# Patient Record
Sex: MALE | Race: WHITE
[De-identification: names, ages, dates, MRNs, and addresses within clinical notes are randomized per-mention and may not be internally consistent; named-entity substitution may affect disease eponyms.]

---

## 2019-06-18 ENCOUNTER — HOSPITAL ENCOUNTER (OUTPATIENT)
Dept: HOSPITAL 95 - PLD | Age: 84
Discharge: HOME | End: 2019-06-18
Attending: PHYSICIAN ASSISTANT
Payer: MEDICARE

## 2019-06-18 DIAGNOSIS — C44.319: Primary | ICD-10-CM

## 2019-07-31 ENCOUNTER — HOSPITAL ENCOUNTER (OUTPATIENT)
Dept: HOSPITAL 95 - LAB SHORT | Age: 84
Discharge: HOME | End: 2019-07-31
Attending: DERMATOLOGY
Payer: MEDICARE

## 2019-07-31 DIAGNOSIS — C44.319: Primary | ICD-10-CM

## 2021-02-16 NOTE — NUR
patient verbalized understanding of discharge instructions and precautions.
alert and oriented. groin site stable. transferred via wheel chair to car
where family was waiting.

## 2021-02-16 NOTE — NUR
PT AMB TO BTR WELL, L GROINS SITE STABLE, DRESSING NOW, WIFE CALLED TO COME
PICK PT UP - WILL BE HERE IN 15-20 MIN. IV DC'D INTACT. PT WILL BE DC'D BY
[facility] BECKER WHEN WIFE ARRIVES

## 2021-03-18 NOTE — NUR
PT BROUGHT BACK TO RECOVERY ROOM, LEFT PEDAL ARTERIAL SITE WITH CLEAR TEGADERM
INTACT, NO BLEEDING NOTED. SOFT NON TENDER. PT EATING AND DRINKING WITH NO
DIFFICULTIES. CALL LIGHT IN REACH.

## 2021-03-18 NOTE — NUR
PT AMBULATES TO RESTROOM WITH SLOW STEADY GAIT, UNMEASURED VOID. GETTING SELF
DRESSED WITH NO NEEDED ASSISTANCE. TR BAND REMAINS ON, SITE SOFT NO BLEEDING
OR OOZING NOTED.

## 2021-03-18 NOTE — NUR
PT WIFE CALLED TO SET UP TRANSPORTATION HOME, PT UP OUT OF BED, STEADY ON
FEET. GETTING SELF DRESSED WITH NO NEEDED ASSISTANCE. LEFT PEDAL SITE
CONTINUES TO BE STABLE. DRESSING C/D/I. NO COMPLAINTS.  % OF MEAL TRAY.
VSS.

## 2021-03-18 NOTE — NUR
PT VERBALIZED UNDERSTANDING OF D/C INSTRUCTIONS. PAPERWORK PROVIDED IN FOLDER.
PT TAKEN OUT TO PRIVATE VEHICLE VIA W/C, NO ACUTE DISTRESS NOTED. ENCOURAGED
TO FOLLOW UP AS SCHEDULED WITH PROVIDER.